# Patient Record
Sex: FEMALE
[De-identification: names, ages, dates, MRNs, and addresses within clinical notes are randomized per-mention and may not be internally consistent; named-entity substitution may affect disease eponyms.]

---

## 2023-08-01 PROBLEM — Z00.00 ENCOUNTER FOR PREVENTIVE HEALTH EXAMINATION: Status: ACTIVE | Noted: 2023-08-01

## 2023-08-02 ENCOUNTER — LABORATORY RESULT (OUTPATIENT)
Age: 38
End: 2023-08-02

## 2023-08-03 ENCOUNTER — APPOINTMENT (OUTPATIENT)
Dept: OBGYN | Facility: CLINIC | Age: 38
End: 2023-08-03
Payer: MEDICAID

## 2023-08-03 VITALS — WEIGHT: 144 LBS | BODY MASS INDEX: 23.14 KG/M2 | HEIGHT: 66 IN

## 2023-08-03 DIAGNOSIS — Z01.419 ENCOUNTER FOR GYNECOLOGICAL EXAMINATION (GENERAL) (ROUTINE) W/OUT ABNORMAL FINDINGS: ICD-10-CM

## 2023-08-03 PROCEDURE — 99385 PREV VISIT NEW AGE 18-39: CPT

## 2023-08-03 NOTE — HISTORY OF PRESENT ILLNESS
[FreeTextEntry1] : 36 yo P1 w/ LMP 23 here for annual exam/to establish care. No complaints. Had Mirena IUD placed  and happy with this method of contraception. Has regular but very light menses. Sexually active with one partner, no issues. No urinary/bowel complaints or abdominal pain.  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

## 2023-08-03 NOTE — DISCUSSION/SUMMARY
[FreeTextEntry1] : 38 yo P1, Mirena IUD in place, annual exam, doing well.  - f/up pap, HPV - discussed Mirena now approved for 7 years, to be removed 2026 - encouraged to find PCP for health care maintenance - return to office 1 year annual exam or PRN

## 2023-08-03 NOTE — PHYSICAL EXAM
[Appropriately responsive] : appropriately responsive [Alert] : alert [No Acute Distress] : no acute distress [No Lymphadenopathy] : no lymphadenopathy [Regular Rate Rhythm] : regular rate rhythm [No Murmurs] : no murmurs [Clear to Auscultation B/L] : clear to auscultation bilaterally [Soft] : soft [Non-tender] : non-tender [Non-distended] : non-distended [No HSM] : No HSM [No Lesions] : no lesions [No Mass] : no mass [Oriented x3] : oriented x3 [Examination Of The Breasts] : a normal appearance [] : implants [No Masses] : no breast masses were palpable [Labia Majora] : normal [Labia Minora] : normal [IUD String] : an IUD string was noted [Normal] : normal [Uterine Adnexae] : normal

## 2023-08-07 LAB — CYTOLOGY CVX/VAG DOC THIN PREP: NORMAL

## 2023-08-10 ENCOUNTER — NON-APPOINTMENT (OUTPATIENT)
Age: 38
End: 2023-08-10

## 2023-08-10 LAB — HPV HIGH+LOW RISK DNA PNL CVX: DETECTED

## 2024-10-03 ENCOUNTER — APPOINTMENT (OUTPATIENT)
Dept: OBGYN | Facility: CLINIC | Age: 39
End: 2024-10-03
Payer: MEDICAID

## 2024-10-03 VITALS — BODY MASS INDEX: 23.3 KG/M2 | WEIGHT: 145 LBS | HEIGHT: 66 IN

## 2024-10-03 DIAGNOSIS — Z01.419 ENCOUNTER FOR GYNECOLOGICAL EXAMINATION (GENERAL) (ROUTINE) W/OUT ABNORMAL FINDINGS: ICD-10-CM

## 2024-10-03 PROCEDURE — 99395 PREV VISIT EST AGE 18-39: CPT

## 2024-10-03 NOTE — HISTORY OF PRESENT ILLNESS
[FreeTextEntry1] : 40 yo P1 w/ LMP 9/25 here for annual exam. Menses typically regular but very light and not painful. Last cycle was heavier than usual and noticed foul odor at this time, since resolved. She was under significant stress the past month. Sexually active, no issues. No abdominal pain or urinary complaints. Has had Mirena IUD since 2019 and happy with this method of contraception.  New job working in foster care  Last pap 2023 NILM HPV HR positive Recently diagnosed with thyroid nodule, needs to schedule biopsy

## 2024-10-03 NOTE — DISCUSSION/SUMMARY
[FreeTextEntry1] : 38 yo P1, Mirena IUD in place, annual exam.  - f/up pap, HPV, vaginitis - strings not visible, visualized on TAUS - return to office 1yr annual exam or PRN

## 2024-10-07 DIAGNOSIS — N76.0 ACUTE VAGINITIS: ICD-10-CM

## 2024-10-07 LAB
A VAGINAE DNA VAG QL NAA+PROBE: NORMAL
BVAB2 DNA VAG QL NAA+PROBE: NORMAL
C KRUSEI DNA VAG QL NAA+PROBE: NEGATIVE
C TRACH RRNA SPEC QL NAA+PROBE: NEGATIVE
CANDIDA DNA VAG QL NAA+PROBE: NEGATIVE
HPV HIGH+LOW RISK DNA PNL CVX: NOT DETECTED
MEGA1 DNA VAG QL NAA+PROBE: ABNORMAL
N GONORRHOEA RRNA SPEC QL NAA+PROBE: NEGATIVE
T VAGINALIS RRNA SPEC QL NAA+PROBE: NEGATIVE

## 2024-10-07 RX ORDER — METRONIDAZOLE 7.5 MG/G
0.75 GEL VAGINAL
Qty: 1 | Refills: 1 | Status: ACTIVE | COMMUNITY
Start: 2024-10-07 | End: 1900-01-01

## 2024-10-08 LAB — CYTOLOGY CVX/VAG DOC THIN PREP: ABNORMAL

## 2025-03-13 ENCOUNTER — EMERGENCY (EMERGENCY)
Facility: HOSPITAL | Age: 40
LOS: 0 days | Discharge: HOME | End: 2025-03-13
Attending: EMERGENCY MEDICINE
Payer: MEDICAID

## 2025-03-13 VITALS
TEMPERATURE: 98 F | HEIGHT: 66 IN | DIASTOLIC BLOOD PRESSURE: 95 MMHG | OXYGEN SATURATION: 98 % | HEART RATE: 93 BPM | WEIGHT: 139.99 LBS | RESPIRATION RATE: 18 BRPM | SYSTOLIC BLOOD PRESSURE: 154 MMHG

## 2025-03-13 DIAGNOSIS — E04.1 NONTOXIC SINGLE THYROID NODULE: ICD-10-CM

## 2025-03-13 DIAGNOSIS — Z86.39 PERSONAL HISTORY OF OTHER ENDOCRINE, NUTRITIONAL AND METABOLIC DISEASE: ICD-10-CM

## 2025-03-13 DIAGNOSIS — E07.9 DISORDER OF THYROID, UNSPECIFIED: ICD-10-CM

## 2025-03-13 DIAGNOSIS — R22.1 LOCALIZED SWELLING, MASS AND LUMP, NECK: ICD-10-CM

## 2025-03-13 LAB
ALBUMIN SERPL ELPH-MCNC: 4.6 G/DL — SIGNIFICANT CHANGE UP (ref 3.5–5.2)
ALP SERPL-CCNC: 67 U/L — SIGNIFICANT CHANGE UP (ref 30–115)
ALT FLD-CCNC: 13 U/L — SIGNIFICANT CHANGE UP (ref 0–41)
ANION GAP SERPL CALC-SCNC: 12 MMOL/L — SIGNIFICANT CHANGE UP (ref 7–14)
AST SERPL-CCNC: 15 U/L — SIGNIFICANT CHANGE UP (ref 0–41)
BASOPHILS # BLD AUTO: 0.07 K/UL — SIGNIFICANT CHANGE UP (ref 0–0.2)
BASOPHILS NFR BLD AUTO: 0.5 % — SIGNIFICANT CHANGE UP (ref 0–1)
BILIRUB SERPL-MCNC: 0.6 MG/DL — SIGNIFICANT CHANGE UP (ref 0.2–1.2)
BUN SERPL-MCNC: 9 MG/DL — LOW (ref 10–20)
CALCIUM SERPL-MCNC: 9.4 MG/DL — SIGNIFICANT CHANGE UP (ref 8.4–10.5)
CHLORIDE SERPL-SCNC: 103 MMOL/L — SIGNIFICANT CHANGE UP (ref 98–110)
CO2 SERPL-SCNC: 24 MMOL/L — SIGNIFICANT CHANGE UP (ref 17–32)
CREAT SERPL-MCNC: 0.6 MG/DL — LOW (ref 0.7–1.5)
EGFR: 118 ML/MIN/1.73M2 — SIGNIFICANT CHANGE UP
EOSINOPHIL # BLD AUTO: 0.07 K/UL — SIGNIFICANT CHANGE UP (ref 0–0.7)
EOSINOPHIL NFR BLD AUTO: 0.5 % — SIGNIFICANT CHANGE UP (ref 0–8)
GLUCOSE SERPL-MCNC: 91 MG/DL — SIGNIFICANT CHANGE UP (ref 70–99)
HCG SERPL QL: NEGATIVE — SIGNIFICANT CHANGE UP
HCT VFR BLD CALC: 37.4 % — SIGNIFICANT CHANGE UP (ref 37–47)
HGB BLD-MCNC: 12.4 G/DL — SIGNIFICANT CHANGE UP (ref 12–16)
IMM GRANULOCYTES NFR BLD AUTO: 0.3 % — SIGNIFICANT CHANGE UP (ref 0.1–0.3)
LACTATE SERPL-SCNC: 0.6 MMOL/L — LOW (ref 0.7–2)
LYMPHOCYTES # BLD AUTO: 1.95 K/UL — SIGNIFICANT CHANGE UP (ref 1.2–3.4)
LYMPHOCYTES # BLD AUTO: 15.3 % — LOW (ref 20.5–51.1)
MCHC RBC-ENTMCNC: 31.2 PG — HIGH (ref 27–31)
MCHC RBC-ENTMCNC: 33.2 G/DL — SIGNIFICANT CHANGE UP (ref 32–37)
MCV RBC AUTO: 94 FL — SIGNIFICANT CHANGE UP (ref 81–99)
MONOCYTES # BLD AUTO: 1 K/UL — HIGH (ref 0.1–0.6)
MONOCYTES NFR BLD AUTO: 7.8 % — SIGNIFICANT CHANGE UP (ref 1.7–9.3)
NEUTROPHILS # BLD AUTO: 9.64 K/UL — HIGH (ref 1.4–6.5)
NEUTROPHILS NFR BLD AUTO: 75.6 % — HIGH (ref 42.2–75.2)
NRBC BLD AUTO-RTO: 0 /100 WBCS — SIGNIFICANT CHANGE UP (ref 0–0)
PLATELET # BLD AUTO: 287 K/UL — SIGNIFICANT CHANGE UP (ref 130–400)
PMV BLD: 9.5 FL — SIGNIFICANT CHANGE UP (ref 7.4–10.4)
POTASSIUM SERPL-MCNC: 4.1 MMOL/L — SIGNIFICANT CHANGE UP (ref 3.5–5)
POTASSIUM SERPL-SCNC: 4.1 MMOL/L — SIGNIFICANT CHANGE UP (ref 3.5–5)
PROT SERPL-MCNC: 7.5 G/DL — SIGNIFICANT CHANGE UP (ref 6–8)
RBC # BLD: 3.98 M/UL — LOW (ref 4.2–5.4)
RBC # FLD: 13 % — SIGNIFICANT CHANGE UP (ref 11.5–14.5)
SODIUM SERPL-SCNC: 139 MMOL/L — SIGNIFICANT CHANGE UP (ref 135–146)
WBC # BLD: 12.77 K/UL — HIGH (ref 4.8–10.8)
WBC # FLD AUTO: 12.77 K/UL — HIGH (ref 4.8–10.8)

## 2025-03-13 PROCEDURE — 99285 EMERGENCY DEPT VISIT HI MDM: CPT

## 2025-03-13 PROCEDURE — 83605 ASSAY OF LACTIC ACID: CPT

## 2025-03-13 PROCEDURE — 70491 CT SOFT TISSUE NECK W/DYE: CPT | Mod: 26

## 2025-03-13 PROCEDURE — 96374 THER/PROPH/DIAG INJ IV PUSH: CPT | Mod: XU

## 2025-03-13 PROCEDURE — 70491 CT SOFT TISSUE NECK W/DYE: CPT | Mod: MC

## 2025-03-13 PROCEDURE — 80053 COMPREHEN METABOLIC PANEL: CPT

## 2025-03-13 PROCEDURE — 36415 COLL VENOUS BLD VENIPUNCTURE: CPT

## 2025-03-13 PROCEDURE — 99284 EMERGENCY DEPT VISIT MOD MDM: CPT | Mod: 25

## 2025-03-13 PROCEDURE — 99283 EMERGENCY DEPT VISIT LOW MDM: CPT

## 2025-03-13 PROCEDURE — 85025 COMPLETE CBC W/AUTO DIFF WBC: CPT

## 2025-03-13 PROCEDURE — 84703 CHORIONIC GONADOTROPIN ASSAY: CPT

## 2025-03-13 RX ORDER — SODIUM CHLORIDE 9 G/1000ML
1000 INJECTION, SOLUTION INTRAVENOUS ONCE
Refills: 0 | Status: COMPLETED | OUTPATIENT
Start: 2025-03-13 | End: 2025-03-13

## 2025-03-13 RX ORDER — KETOROLAC TROMETHAMINE 30 MG/ML
15 INJECTION, SOLUTION INTRAMUSCULAR; INTRAVENOUS ONCE
Refills: 0 | Status: DISCONTINUED | OUTPATIENT
Start: 2025-03-13 | End: 2025-03-13

## 2025-03-13 RX ORDER — KETOROLAC TROMETHAMINE 30 MG/ML
1 INJECTION, SOLUTION INTRAMUSCULAR; INTRAVENOUS
Qty: 15 | Refills: 0
Start: 2025-03-13 | End: 2025-03-17

## 2025-03-13 RX ADMIN — KETOROLAC TROMETHAMINE 15 MILLIGRAM(S): 30 INJECTION, SOLUTION INTRAMUSCULAR; INTRAVENOUS at 17:10

## 2025-03-13 RX ADMIN — SODIUM CHLORIDE 1000 MILLILITER(S): 9 INJECTION, SOLUTION INTRAVENOUS at 17:10

## 2025-03-13 NOTE — ED PROVIDER NOTE - PATIENT PORTAL LINK FT
You can access the FollowMyHealth Patient Portal offered by [facility] by registering at the following website: [email or web address]. By joining [employer]’s FollowMyHealth portal, you will also be able to view your health information using other applications (apps) compatible with our system.

## 2025-03-13 NOTE — ED PROVIDER NOTE - CARE PROVIDERS DIRECT ADDRESSES
[email or web address]

## 2025-03-13 NOTE — ED PROVIDER NOTE - OBJECTIVE STATEMENT
39 year old female, past medical history thyroid cyst, who presents with L neck swelling. patient with known thyroid cyst s/p benign biopsy 10/2024, who presents with L neck swelling/pain. patient with worsening L neck swelling and pain x3 days with odynophagia. denies f/c, drooling, nausea/vomiting, shortness of breath, skin changes.

## 2025-03-13 NOTE — ED ADULT NURSE NOTE - OBJECTIVE STATEMENT
Presents to ED c/o worsening pain by cyst on thyroid. pt was dx'd in Oct with thyroid cyst. Airway patent, no signs of difficulty breathing noted.

## 2025-03-13 NOTE — ED PROVIDER NOTE - ATTENDING APP SHARED VISIT CONTRIBUTION OF CARE
40 yo f with pmh of goiter, sent in by pmd for evaluation of neck mass.  pt says noted became hard and painful for the last 3 days.  no fever ,but has chills.  pt says she can feel it now when she swallows which is new.  pt admits had biopsy 10/24 and was dx as nodular goiter.  pt says she does not recall having her thyroid levels checked.  was not referred to endocrine or ent.  no cp, sob, abd pain.  no difficulty handling her secretions.  exam: nad, ncat, perrl, eomi, mmm, no stridor, L anterior neck mass, indurated, no overlying erythema, ttp, rrr, ctab, abd soft, nt, nd aox3, no calf tenderness, no pitting edema imp: pt with enlarging neck mass, will check labs, CT neck, pain control

## 2025-03-13 NOTE — CONSULT NOTE ADULT - PROBLEM SELECTOR RECOMMENDATION 9
May give Toradol for pain as needed  Case d/w ENT Attending, will make arrangements to have pt seen in office in the next 1-3 days  If pt symptoms worsen or if develops fever or breathing difficulty pt instructed to return to the ED immediately  Pt will bring prior tests results and imaging with her to our f/u appointment

## 2025-03-13 NOTE — ED ADULT TRIAGE NOTE - CHIEF COMPLAINT QUOTE
Pt presents to the ED c/o of cyst on thyroid. Pt states she recently had it biopsied and its starting to cause her pain. PCP sent her in for further evaluation.

## 2025-03-13 NOTE — ED PROVIDER NOTE - PHYSICAL EXAMINATION
CONSTITUTIONAL: non-toxic appearing female, nad  SKIN: skin exam is warm and dry  ENT: +L neck swelling/tenderness, no overlying skin changes, +erythematous oropharynx, no exudates, uvula midline, tolerating secretions, no trismus or drooling, no stridor   NECK: ROM intact.  CARD: S1, S2 normal, no murmur  RESP: No wheezes, rales or rhonchi. Good air movement bilaterally  ABD: soft; non-distended; non-tender.    NEURO: awake, alert, following commands, oriented, grossly unremarkable. No Focal deficits. GCS 15.   PSYCH: Cooperative, appropriate.

## 2025-03-13 NOTE — ED PROVIDER NOTE - CARE PROVIDER_API CALL
Johan Lowe  Otolaryngology  [address] [place] [address]  Phone: [phone]  Fax: [phone]  Follow Up Time: Urgent    Lior Tan  Head/Neck Surgery  [address] [place] [address]  Phone: [phone]  Fax: [phone]  Follow Up Time: Urgent

## 2025-03-13 NOTE — ED PROVIDER NOTE - PROGRESS NOTE DETAILS
ent consulted patient comfortable, reports feeling comfortable after pain meds, tolerating secretions, no muffled voice or drooling, no stridor, no distress. ent bedside, plan for outpatient follow up tomorrow with ent office. shared decision making with team and patient discussed - plan for dc with close outpatient fu and pain control. strict return precautions discussed, patient verbalizes understanding and is agreeable with plan.

## 2025-03-13 NOTE — ED PROVIDER NOTE - NSFOLLOWUPINSTRUCTIONS_ED_ALL_ED_FT
Please follow up with ENT tomorrow.   Please be aware of any new or worsening signs or symptoms that should prompt your return to the ER.    Thyroid Nodule  Image   A thyroid nodule is an isolated growth of thyroid cells that forms a lump in your thyroid gland. The thyroid gland is a butterfly-shaped gland. It is found in the lower front of your neck. This gland sends chemical messengers (hormones) through your blood to all parts of your body. These hormones are important in regulating your body temperature and helping your body to use energy. Thyroid nodules are common. Most are not cancerous (are benign). You may have one nodule or several nodules.  Different types of thyroid nodules include:  Nodules that grow and fill with fluid (thyroid cysts).Nodules that produce too much thyroid hormone (hot nodules or hyperthyroid).Nodules that produce no thyroid hormone (cold nodules or hypothyroid).Nodules that form from cancer cells (thyroid cancers).What are the causes?  Usually, the cause of this condition is not known.  What increases the risk?  Factors that make this condition more likely to develop include:  Increasing age. Thyroid nodules become more common in people who are older than 45 years of age.Gender.  Benign thyroid nodules are more common in women.Cancerous (malignant) thyroid nodules are more common in men.A family history that includes:  Thyroid nodules.Pheochromocytoma.Thyroid carcinoma.Hyperparathyroidism.Certain kinds of thyroid diseases, such as Hashimoto thyroiditis.Lack of iodine.A history of head and neck radiation, such as from X-rays.What are the signs or symptoms?  It is common for this condition to cause no symptoms. If you have symptoms, they may include:  A lump in your lower neck.Feeling a lump or tickle in your throat.Pain in your neck, jaw, or ear.Having trouble swallowing.Hot nodules may cause symptoms that include:  Weight loss.Warm, flushed skin.Feeling hot.Feeling nervous.A racing heartbeat.Cold nodules may cause symptoms that include:  Weight gain.Dry skin.Brittle hair. This may also occur with hair loss.Feeling cold.Fatigue.Thyroid cancer nodules may cause symptoms that include:  Hard nodules that feel stuck to the thyroid gland.Hoarseness.Lumps in the glands near your thyroid (lymph nodes).How is this diagnosed?  A thyroid nodule may be felt by your health care provider during a physical exam. This condition may also be diagnosed based on your symptoms. You may also have tests, including:  An ultrasound. This may be done to confirm the diagnosis.A biopsy. This involves taking a sample from the nodule and looking at it under a microscope to see if the nodule is benign.Blood tests to make sure that your thyroid is working properly.Imaging tests such as MRI or CT scan may be done if:  Your nodule is large.Your nodule is blocking your airway.Cancer is suspected.How is this treated?  Treatment depends on the cause and size of your nodule or nodules. If the nodule is benign, treatment may not be necessary. Your health care provider may monitor the nodule to see if it goes away without treatment. If the nodule continues to grow, is cancerous, or does not go away:  It may need to be drained with a needle.It may need to be removed with surgery.If you have surgery, part or all of your thyroid gland may need to be removed as well.  Follow these instructions at home:  Pay attention to any changes in your nodule.Take over-the-counter and prescription medicines only as told by your health care provider.Keep all follow-up visits as told by your health care provider. This is important.Contact a health care provider if:  Your voice changes.You have trouble swallowing.You have pain in your neck, ear, or jaw that is getting worse.Your nodule gets bigger.Your nodule starts to make it harder for you to breathe.Get help right away if:  You have a sudden fever.You feel very weak.Your muscles look like they are shrinking (muscle wasting).You have mood swings.You feel very restless.You feel confused.You are seeing or hearing things that other people do not see or hear (having hallucinations).You feel suddenly nauseous or throw up.You suddenly have diarrhea.You have chest pain.There is a loss of consciousness.This information is not intended to replace advice given to you by your health care provider. Make sure you discuss any questions you have with your health care provider.

## 2025-03-13 NOTE — ED PROVIDER NOTE - CLINICAL SUMMARY MEDICAL DECISION MAKING FREE TEXT BOX
PT with neck mass, became painful, nodule on CT.  ent consulted and will f/u with pt at the office.  pt is stable for dc and agreeable to plan.  Pt was given strict return to ED precautions and pt indicated that he/she understood. Any ordered labs and EKG were reviewed, Dr. Ana Lilia Dean, attending physician.  Any imaging was ordered and reviewed by me, Dr. Ana Lilia Dean, attending physician.  Appropriate medications for patient's presenting complaints were ordered and effects were reassessed.  Patient's records, if available,  (prior hospital, ED visit, and/or nursing home notes if available) were reviewed.  Additional history was obtained from EMS, family, and/or PCP (when available).  Escalation to admission/observation was considered.  1) However patient feels much better and is comfortable with discharge.  Appropriate follow-up was arranged.

## 2025-03-13 NOTE — ED PROVIDER NOTE - PROVIDER TOKENS
PROVIDER:[TOKEN:[[id number]],FOLLOWUP:[Urgent]],PROVIDER:[TOKEN:[[id number]],FOLLOWUP:[Urgent]]

## 2025-03-14 RX ORDER — KETOROLAC TROMETHAMINE 30 MG/ML
1 INJECTION, SOLUTION INTRAMUSCULAR; INTRAVENOUS
Qty: 15 | Refills: 0
Start: 2025-03-14 | End: 2025-03-18

## 2025-03-14 NOTE — CHART NOTE - NSCHARTNOTEFT_GEN_A_CORE
Spoke to pt. Pt self scheduled f/u appt for 3/17 @ [address] [place] [address] (ENT referral) CT 3/14.

## 2025-03-17 ENCOUNTER — NON-APPOINTMENT (OUTPATIENT)
Age: 40
End: 2025-03-17

## 2025-03-17 ENCOUNTER — APPOINTMENT (OUTPATIENT)
Dept: OTOLARYNGOLOGY | Facility: CLINIC | Age: 40
End: 2025-03-17
Payer: MEDICAID

## 2025-03-17 DIAGNOSIS — E04.9 NONTOXIC GOITER, UNSPECIFIED: ICD-10-CM

## 2025-03-17 DIAGNOSIS — E04.1 NONTOXIC SINGLE THYROID NODULE: ICD-10-CM

## 2025-03-17 PROCEDURE — 99204 OFFICE O/P NEW MOD 45 MIN: CPT

## 2025-03-17 PROCEDURE — 10021 FNA BX W/O IMG GDN 1ST LES: CPT

## 2025-03-20 LAB — FNA, THYROID: NORMAL

## 2025-04-15 ENCOUNTER — APPOINTMENT (OUTPATIENT)
Dept: OTOLARYNGOLOGY | Facility: CLINIC | Age: 40
End: 2025-04-15
Payer: MEDICAID

## 2025-04-15 ENCOUNTER — TRANSCRIPTION ENCOUNTER (OUTPATIENT)
Age: 40
End: 2025-04-15

## 2025-04-15 VITALS
OXYGEN SATURATION: 99 % | DIASTOLIC BLOOD PRESSURE: 79 MMHG | HEART RATE: 70 BPM | BODY MASS INDEX: 23.3 KG/M2 | WEIGHT: 145 LBS | HEIGHT: 66 IN | SYSTOLIC BLOOD PRESSURE: 118 MMHG

## 2025-04-15 DIAGNOSIS — E04.1 NONTOXIC SINGLE THYROID NODULE: ICD-10-CM

## 2025-04-15 PROCEDURE — 99204 OFFICE O/P NEW MOD 45 MIN: CPT
